# Patient Record
Sex: FEMALE | Race: BLACK OR AFRICAN AMERICAN | Employment: OTHER | URBAN - METROPOLITAN AREA
[De-identification: names, ages, dates, MRNs, and addresses within clinical notes are randomized per-mention and may not be internally consistent; named-entity substitution may affect disease eponyms.]

---

## 2018-06-26 ENCOUNTER — HOSPITAL ENCOUNTER (OUTPATIENT)
Age: 58
Setting detail: OUTPATIENT SURGERY
Discharge: HOME | End: 2018-06-26
Attending: INTERNAL MEDICINE | Admitting: INTERNAL MEDICINE
Payer: COMMERCIAL

## 2018-06-26 VITALS
DIASTOLIC BLOOD PRESSURE: 68 MMHG | TEMPERATURE: 96.9 F | OXYGEN SATURATION: 100 % | RESPIRATION RATE: 20 BRPM | HEIGHT: 63 IN | SYSTOLIC BLOOD PRESSURE: 119 MMHG | WEIGHT: 189 LBS | HEART RATE: 76 BPM | BODY MASS INDEX: 33.49 KG/M2

## 2018-06-26 LAB — GLUCOSE BLD STRIP.AUTO-MCNC: 111 MG/DL (ref 70–110)

## 2018-06-26 PROCEDURE — 99153 MOD SED SAME PHYS/QHP EA: CPT | Performed by: INTERNAL MEDICINE

## 2018-06-26 PROCEDURE — G0500 MOD SEDAT ENDO SERVICE >5YRS: HCPCS | Performed by: INTERNAL MEDICINE

## 2018-06-26 PROCEDURE — 74011250636 HC RX REV CODE- 250/636: Performed by: INTERNAL MEDICINE

## 2018-06-26 PROCEDURE — 76040000007: Performed by: INTERNAL MEDICINE

## 2018-06-26 PROCEDURE — 82962 GLUCOSE BLOOD TEST: CPT

## 2018-06-26 PROCEDURE — 77030013991 HC SNR POLYP ENDOSC BSC -A: Performed by: INTERNAL MEDICINE

## 2018-06-26 PROCEDURE — 74011250636 HC RX REV CODE- 250/636

## 2018-06-26 PROCEDURE — 77030038020 HC MANFLD NEPTUNE STRY -B: Performed by: INTERNAL MEDICINE

## 2018-06-26 PROCEDURE — 77030020256 HC SOL INJ NACL 0.9%  500ML: Performed by: INTERNAL MEDICINE

## 2018-06-26 PROCEDURE — 88305 TISSUE EXAM BY PATHOLOGIST: CPT | Performed by: INTERNAL MEDICINE

## 2018-06-26 RX ORDER — NALOXONE HYDROCHLORIDE 0.4 MG/ML
0.4 INJECTION, SOLUTION INTRAMUSCULAR; INTRAVENOUS; SUBCUTANEOUS
Status: DISCONTINUED | OUTPATIENT
Start: 2018-06-26 | End: 2018-06-26 | Stop reason: HOSPADM

## 2018-06-26 RX ORDER — DEXTROMETHORPHAN/PSEUDOEPHED 2.5-7.5/.8
1.2 DROPS ORAL
Status: CANCELLED | OUTPATIENT
Start: 2018-06-26

## 2018-06-26 RX ORDER — SODIUM CHLORIDE 9 MG/ML
125 INJECTION, SOLUTION INTRAVENOUS CONTINUOUS
Status: DISCONTINUED | OUTPATIENT
Start: 2018-06-26 | End: 2018-06-26 | Stop reason: HOSPADM

## 2018-06-26 RX ORDER — FLUMAZENIL 0.1 MG/ML
0.2 INJECTION INTRAVENOUS
Status: DISCONTINUED | OUTPATIENT
Start: 2018-06-26 | End: 2018-06-26 | Stop reason: HOSPADM

## 2018-06-26 RX ORDER — FENTANYL CITRATE 50 UG/ML
100 INJECTION, SOLUTION INTRAMUSCULAR; INTRAVENOUS
Status: DISCONTINUED | OUTPATIENT
Start: 2018-06-26 | End: 2018-06-26 | Stop reason: HOSPADM

## 2018-06-26 RX ORDER — GABAPENTIN 300 MG/1
500 CAPSULE ORAL 2 TIMES DAILY
COMMUNITY

## 2018-06-26 RX ORDER — EZETIMIBE 10 MG/1
10 TABLET ORAL
COMMUNITY

## 2018-06-26 RX ORDER — ATROPINE SULFATE 0.1 MG/ML
0.5 INJECTION INTRAVENOUS
Status: CANCELLED | OUTPATIENT
Start: 2018-06-26 | End: 2018-06-27

## 2018-06-26 RX ORDER — MIDAZOLAM HYDROCHLORIDE 1 MG/ML
.5-5 INJECTION, SOLUTION INTRAMUSCULAR; INTRAVENOUS
Status: DISCONTINUED | OUTPATIENT
Start: 2018-06-26 | End: 2018-06-26 | Stop reason: HOSPADM

## 2018-06-26 RX ORDER — ATORVASTATIN CALCIUM 20 MG/1
20 TABLET, FILM COATED ORAL DAILY
COMMUNITY

## 2018-06-26 RX ORDER — METFORMIN HYDROCHLORIDE 500 MG/1
500 TABLET ORAL 2 TIMES DAILY WITH MEALS
COMMUNITY

## 2018-06-26 RX ORDER — SODIUM CHLORIDE 9 MG/ML
100 INJECTION, SOLUTION INTRAVENOUS CONTINUOUS
Status: CANCELLED | OUTPATIENT
Start: 2018-06-26 | End: 2018-06-26

## 2018-06-26 RX ORDER — EPINEPHRINE 0.1 MG/ML
1 INJECTION INTRACARDIAC; INTRAVENOUS
Status: CANCELLED | OUTPATIENT
Start: 2018-06-26 | End: 2018-06-27

## 2018-06-26 RX ADMIN — SODIUM CHLORIDE 125 ML/HR: 900 INJECTION, SOLUTION INTRAVENOUS at 08:59

## 2018-06-26 NOTE — DISCHARGE INSTRUCTIONS
Melanie Lyons  [id number]  1960    COLON DISCHARGE INSTRUCTIONS    Discomfort:  Redness at IV site- apply warm compress to area; if redness or soreness persist- contact your physician  There may be a slight amount of blood passed from the rectum  Gaseous discomfort- walking, belching will help relieve any discomfort  You may not operate a vehicle til the next day. You may not engage in an occupation involving machinery or appliances til the next day. You may not drink alcoholic beverages til the next day. DIET:   High fiber diet. ACTIVITY:  You may not  resume your normal daily activities til the next day. it is recommended that you spend the remainder of the day resting -  avoid any strenuous activity. CALL M.D.  IF ANY SIGN OF:   Increasing pain, nausea, vomiting  Abdominal distension (swelling)  New increased bleeding (oral or rectal)  Fever (chills)  Pain in chest area  Bloody discharge from nose or mouth  Shortness of breath    You may not  take any Advil, Aspirin, Ibuprofen, Motrin, Aleve, or Goodys for 5 days, ONLY  Tylenol as needed for pain. Post procedure diagnosis:  Small internal hemorrhoids. Sigmoid polyps    Follow-up Instructions: Your follow up colonoscopy will be in 10 years pending the result of the histology. We will notify you the results of your biopsy by letter within 2 weeks. Holly Chan MD  June 26, 2018     Patient armband removed and shredded    DISCHARGE SUMMARY from Nurse    PATIENT INSTRUCTIONS:    After general anesthesia or intravenous sedation, for 24 hours or while taking prescription Narcotics:  · Limit your activities  · Do not drive and operate hazardous machinery  · Do not make important personal or business decisions  · Do  not drink alcoholic beverages  · If you have not urinated within 8 hours after discharge, please contact your surgeon on call.     Report the following to your surgeon:  · Excessive pain, swelling, redness or odor of or around the surgical area  · Temperature over 100.5  · Nausea and vomiting lasting longer than 4 hours or if unable to take medications  · Any signs of decreased circulation or nerve impairment to extremity: change in color, persistent  numbness, tingling, coldness or increase pain  · Any questions    What to do at Home:  Recommended activity: Activity as tolerated and no driving for today,     If you experience any of the following symptoms as above, please follow up with dr. Leander Siu. *  Please give a list of your current medications to your Primary Care Provider. *  Please update this list whenever your medications are discontinued, doses are      changed, or new medications (including over-the-counter products) are added. *  Please carry medication information at all times in case of emergency situations. These are general instructions for a healthy lifestyle:    No smoking/ No tobacco products/ Avoid exposure to second hand smoke  Surgeon General's Warning:  Quitting smoking now greatly reduces serious risk to your health. Obesity, smoking, and sedentary lifestyle greatly increases your risk for illness    A healthy diet, regular physical exercise & weight monitoring are important for maintaining a healthy lifestyle    You may be retaining fluid if you have a history of heart failure or if you experience any of the following symptoms:  Weight gain of 3 pounds or more overnight or 5 pounds in a week, increased swelling in our hands or feet or shortness of breath while lying flat in bed. Please call your doctor as soon as you notice any of these symptoms; do not wait until your next office visit. Recognize signs and symptoms of STROKE:    F-face looks uneven    A-arms unable to move or move unevenly    S-speech slurred or non-existent    T-time-call 911 as soon as signs and symptoms begin-DO NOT go       Back to bed or wait to see if you get better-TIME IS BRAIN.     Warning Signs of HEART ATTACK     Call 911 if you have these symptoms:   Chest discomfort. Most heart attacks involve discomfort in the center of the chest that lasts more than a few minutes, or that goes away and comes back. It can feel like uncomfortable pressure, squeezing, fullness, or pain.  Discomfort in other areas of the upper body. Symptoms can include pain or discomfort in one or both arms, the back, neck, jaw, or stomach.  Shortness of breath with or without chest discomfort.  Other signs may include breaking out in a cold sweat, nausea, or lightheadedness. Don't wait more than five minutes to call 911 - MINUTES MATTER! Fast action can save your life. Calling 911 is almost always the fastest way to get lifesaving treatment. Emergency Medical Services staff can begin treatment when they arrive -- up to an hour sooner than if someone gets to the hospital by car. The discharge information has been reviewed with the patient and caregiver. The patient and caregiver verbalized understanding. Discharge medications reviewed with the patient and caregiver and appropriate educational materials and side effects teaching were provided.   ___________________________________________________________________________________________________________________________________

## 2018-06-26 NOTE — IP AVS SNAPSHOT
[address] [place] [id number] 
[phone] Patient: Tiesha Marti MRN: [id number] CGK:[id number] About your hospitalization You were admitted on:  June 26, 2018 You last received care in the:  [facility] ENDOSCOPY You were discharged on:  June 26, 2018 Why you were hospitalized Your primary diagnosis was:  Not on File Follow-up Information Follow up With Details Comments Contact Info Sj Cool MD   [address] [place] [address] [address] [place] [address] 
[phone] Carla Winkler MD   Patient can only remember the practice name and not the physician Discharge Orders None A check aki indicates which time of day the medication should be taken. My Medications CONTINUE taking these medications Instructions Each Dose to Equal  
 Morning Noon Evening Bedtime  
 gabapentin 300 mg capsule Commonly known as:  NEURONTIN Your last dose was: Your next dose is: Take 500 mg by mouth two (2) times a day. 500 mg  
    
   
   
   
  
 LIPITOR 20 mg tablet Generic drug:  atorvastatin Your last dose was: Your next dose is: Take 20 mg by mouth daily. 20 mg  
    
   
   
   
  
 Liraglutide 0.6 mg/0.1 mL (18 mg/3 mL) Pnij Commonly known as:  Abhishek Maynard Your last dose was: Your next dose is: 1.2 mg by SubCUTAneous route daily. 1.2 mg  
    
   
   
   
  
 metFORMIN 500 mg tablet Commonly known as:  GLUCOPHAGE Your last dose was: Your next dose is: Take 500 mg by mouth two (2) times daily (with meals). 500 mg ZETIA 10 mg tablet Generic drug:  ezetimibe Your last dose was: Your next dose is: Take 10 mg by mouth. 10 mg Discharge Instructions Tiesha Marti 
[id number] 1960 COLON DISCHARGE INSTRUCTIONS Discomfort: 
Redness at IV site- apply warm compress to area; if redness or soreness persist- contact your physician There may be a slight amount of blood passed from the rectum Gaseous discomfort- walking, belching will help relieve any discomfort You may not operate a vehicle til the next day. You may not engage in an occupation involving machinery or appliances til the next day. You may not drink alcoholic beverages til the next day. DIET: 
 High fiber diet. ACTIVITY: 
You may not  resume your normal daily activities til the next day. it is recommended that you spend the remainder of the day resting -  avoid any strenuous activity. CALL BRANDON Mejia ANY SIGN OF: Increasing pain, nausea, vomiting Abdominal distension (swelling) New increased bleeding (oral or rectal) Fever (chills) Pain in chest area Bloody discharge from nose or mouth Shortness of breath You may not  take any Advil, Aspirin, Ibuprofen, Motrin, Aleve, or Goodys for 5 days, ONLY  Tylenol as needed for pain. Post procedure diagnosis:  Small internal hemorrhoids. Sigmoid polyps Follow-up Instructions: Your follow up colonoscopy will be in 10 years pending the result of the histology. We will notify you the results of your biopsy by letter within 2 weeks. Sarah Partida MD 
June 26, 2018 Patient armband removed and shredded DISCHARGE SUMMARY from Nurse PATIENT INSTRUCTIONS: 
 
 
F-face looks uneven A-arms unable to move or move unevenly S-speech slurred or non-existent T-time-call 911 as soon as signs and symptoms begin-DO NOT go Back to bed or wait to see if you get better-TIME IS BRAIN.  
 
Warning Signs of HEART ATTACK  
 
 Call 911 if you have these symptoms: 
? Chest discomfort. Most heart attacks involve discomfort in the center of the chest that lasts more than a few minutes, or that goes away and comes back. It can feel like uncomfortable pressure, squeezing, fullness, or pain. ? Discomfort in other areas of the upper body. Symptoms can include pain or discomfort in one or both arms, the back, neck, jaw, or stomach. ? Shortness of breath with or without chest discomfort. ? Other signs may include breaking out in a cold sweat, nausea, or lightheadedness. Don't wait more than five minutes to call [address]! Fast action can save your life. Calling 911 is almost always the fastest way to get lifesaving treatment. Emergency Medical Services staff can begin treatment when they arrive  up to an hour sooner than if someone gets to the hospital by car. The discharge information has been reviewed with the patient and caregiver. The patient and caregiver verbalized understanding. Discharge medications reviewed with the patient and caregiver and appropriate educational materials and side effects teaching were provided. ___________________________________________________________________________________________________________________________________ Introducing [facility]! [facility] introduces Tutorspree patient portal. Now you can access parts of your medical record, email your doctor's office, and request medication refills online. 1. In your internet browser, go to [email or web address] 2. Click on the First Time User? Click Here link in the Sign In box. You will see the New Member Sign Up page. 3. Enter your Tutorspree Access Code exactly as it appears below. You will not need to use this code after youve completed the sign-up process. If you do not sign up before the expiration date, you must request a new code.  
 
· Tutorspree Access Code: [email or web address] 
 Expires: 9/24/2018 11:03 AM 
 
4. Enter the last four digits of your Social Security Number (xxxx) and Date of Birth (mm/dd/yyyy) as indicated and click Submit. You will be taken to the next sign-up page. 5. Create a i[employer] ID. This will be your DanceOn login ID and cannot be changed, so think of one that is secure and easy to remember. 6. Create a DanceOn password. You can change your password at any time. 7. Enter your Password Reset Question and Answer. This can be used at a later time if you forget your password. 8. Enter your e-mail address. You will receive e-mail notification when new information is available in [address] 9. Click Sign Up. You can now view and download portions of your medical record. 10. Click the Download Summary menu link to download a portable copy of your medical information. If you have questions, please visit the Frequently Asked Questions section of the DanceOn website. Remember, DanceOn is NOT to be used for urgent needs. For medical emergencies, dial 911. Now available from your iPhone and Android! Introducing Eleazar Nieves As a [employer] patient, I wanted to make you aware of our electronic visit tool called Eleazar Nieves. [employer] 24/7 allows you to connect within minutes with a medical provider 24 hours a day, seven days a week via a mobile device or tablet or logging into a secure website from your computer. You can access Eleazar Pabloromeofin from anywhere in the [place]. A virtual visit might be right for you when you have a simple condition and feel like you just dont want to get out of bed, or cant get away from work for an appointment, when your regular [employer] provider is not available (evenings, weekends or holidays), or when youre out of town and need minor care.   Electronic visits cost only $49 and if the Eleazar Ezequiel provider determines a prescription is needed to treat your condition, one can be electronically transmitted to a nearby pharmacy*. Please take a moment to enroll today if you have not already done so. The enrollment process is free and takes just a few minutes. To enroll, please download the [employer] 24/7 brenda to your tablet or phone, or visit [email or web address] to enroll on your computer. And, as an [address] [place] [address] patient with a [employer] account, the results of your visits will be scanned into your electronic medical record and your primary care provider will be able to view the scanned results. We urge you to continue to see your regular [employer] provider for your ongoing medical care. And while your primary care provider may not be the one available when you seek a [employer] virtual visit, the peace of mind you get from getting a real diagnosis real time can be priceless. For more information on [employer], view our Frequently Asked Questions (FAQs) at [email or web address]. Sincerely, 
 
Vic Souza MD 
Chief Medical Officer [employer] *:  certain medications cannot be prescribed via [employer] Providers Seen During Your Hospitalization Provider Specialty Primary office phone Kaylie Mcgowan MD Gastroenterology [phone] Your Primary Care Physician (PCP) Primary Care Physician Office Phone Office Fax OTHER, PHYS ** None ** ** None ** You are allergic to the following Allergen Reactions Norco (Hydrocodone-Acetaminophen) Itching Recent Documentation Height Weight Breastfeeding? BMI OB Status Smoking Status 1.6 m 85.7 kg No 33.48 kg/m2 Hysterectomy Former Smoker Emergency Contacts Name Discharge Info Relation Home Work Mobile Yannick Dixon DISCHARGE CAREGIVER [3] Other Relative [6]   [phone] Patient Belongings The following personal items are in your possession at time of discharge: Dental Appliances: None  Visual Aid: Glasses, With patient Please provide this summary of care documentation to your next provider. Signatures-by signing, you are acknowledging that this After Visit Summary has been reviewed with you and you have received a copy. Patient Signature:  ____________________________________________________________ Date:  ____________________________________________________________  
  
Deep Endo Provider Signature:  ____________________________________________________________ Date:  ____________________________________________________________

## 2018-06-26 NOTE — H&P
This 60year old female presents for Colon Cancer Screening. History of Present Illness:  1. Colon Cancer Screening      Prior screening:  colonoscopy and 1/2011 Dr. Kelechi Singh. Risk Factors: history of other malignancy and M-Brain. Pertinent negatives include abdominal pain, change in bowel habits, constipation, decreased appetite, diarrhea, melena, nausea, vomiting and weight loss. Additional information: No family history of colon cancer, No family history of Crohn's/colitis and No NSAID/ASA use.             Chronic conditions addressed today:  Diagnosis Description Code Status HPI Comments   Personal history of colonic polyps Z86.010         PROBLEM LIST:  Problem Description Onset Date   Joint pain in ankle and foot 04/02/2013   Hyperlipidemia due to type 2 diabetes mellitus 03/10/2017   H/O lower GIT neoplasm 06/08/2018   Benign essential hypertension 11/17/2008   Personal history of colonic polyps 05/23/2018   H/O: hysterectomy    Nonproliferative diabetic retinopathy 03/27/2013     PROBLEM LIST (not yet mapped to SNOMED-CT®):  Problem Description Onset Date Notes   Diabetes with ophthalmic manifestations, type II o 03/27/2013          PAST MEDICAL/SURGICAL HISTORY   (Detailed)    Disease/disorder Onset Date Management Date Comments   breast mass 2013 Biopsy     Diabetes 1994 Diabetic eye exam 10/23/2017    Diabetes 1994 Foot Exam 12/06/2013    Diabetes type 2 1994      Diabetes type 2  Diabetic Foot Exam 05/23/2018      hysterectomy-BSO 1987      Appendectomy       Gall Bladder     Hyperlipidemia       Hypertension         DIAGNOSTICS HISTORY:  Test Ordered Interpretation Result completed   Mammogram, screening, both breasts    05/01/2007   Cardiac Stress Test    05/01/2006   LUNG DIFFERENTIAL FUNCTION 12/10/2009 Abnormal mild restriction 12/10/2009   ELECTROCARDIOGRAM, COMPLETE 12/10/2009 Normal  12/10/2009   CHEST X-RAY PA & LAT 12/10/2009   12/10/2009   * Screening mammography digital 03/21/2012 normal  04/12/2012   * X-RAY EXAM OF SHOULDER BLADE RT 12/12/2012 12/12/2012   * X-RAY EXAM OF SHOULDER Min 2 Views RT 12/12/2012 12/12/2012   MAMMOGRAM, SCREENING 02/27/2013 abnormal Right breast assymetry. see scanned/cc 04/16/2013   * X-RAY EXAM OF ANKLE, Complete RT ankle 04/09/2013 See details  Location is the ankle. the patient has a large dorsal exostosis along the navicular cuneiform joint of the right foot. There is also some spurring on the plantar aspect of the heel. Ankle shows no degenerative changes or acute fractures 04/09/2013   *Diagnostic mammography digital 12/03/2013 normal  12/04/2013   * Foot 3 Views Right foot 12/11/2013  x-rays reveal end-stage arthritis along the Lisfranc joint complex. The patient also has dorsal exostosis along the first metatarsocuneiform and navicular cuneiform joint. The remainder of the joint in the rear foot showed no signs of arthritis 12/11/2013   * Foot 3 Views Right foot 05/14/2014 05/28/2014   * Foot 3 Views Right foot 05/28/2014  Intact internal fixation is appreciated. There appears to be a fusion across the second metatarsal cuneiform joint. 05/28/2014   *Diagnostic mammography digital Bilateral 12/15/2014 normal  12/30/2014     Test Ordered Ordering Comments Modifier   Mammogram, screening, both breasts      Cardiac Stress Test      LUNG DIFFERENTIAL FUNCTION 12/10/2009     ELECTROCARDIOGRAM, COMPLETE 12/10/2009     CHEST X-RAY PA & LAT 12/10/2009     * Screening mammography digital 03/21/2012     * X-RAY EXAM OF SHOULDER BLADE RT 12/12/2012 Not currently pregnant. RT   * X-RAY EXAM OF SHOULDER Min 2 Views RT 12/12/2012 Not currently pregnant. RT   MAMMOGRAM, SCREENING 02/27/2013     * X-RAY EXAM OF ANKLE, Complete RT ankle 04/09/2013 Not currently pregnant.     *Diagnostic mammography digital 12/03/2013     * Foot 3 Views Right foot 12/11/2013  RT   * Foot 3 Views Right foot 05/14/2014  RT   * Foot 3 Views Right foot 05/28/2014  RT   *Diagnostic mammography digital Bilateral 12/15/2014       Family History:  (Detailed)  Relationship Family Member Name  Age at Death Condition Onset Age Cause of Death   Father    Myocardial infarction  N   Father    Hypertension  N   Father  N  Heart disease 58 N   Mother  Y 80 Stroke  N   Mother    Cancer, lung  N   Mother    Hyperlipidemia  N   Mother    Cancer, brain  N   Mother    Hypertension  N         Social History:  (Detailed)  Tobacco use reviewed. The patient is right-handed. Preferred language is [place]. The patient does not need an . 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 Previously   CHILDREN  Does not have children. Tobacco use status: Ex-cigarette smoker. Smoking status: Former smoker. SMOKING STATUS  Use Status Type Smoking Status Usage Per Day Years Used Total Pack Years   yes Cigarette Former smoker 4 Cigarettes 38.00 7.60     TOBACCO CESSATION INFORMATION  Date Counseled By Order Status Description Code Tobacco Cessation Information   2010 Rhina Alba Tobacco cessation counseling completed   Tobacco cessation counseling       No passive smoke exposure. ALCOHOL  There is no history of alcohol use. CAFFEINE  The patient uses caffeine: coffee and soda. .            Patient Status   Completed with information received for patient transitioning into care. Medication Reconciliation  Medications reconciled today.   Medication Reviewed  Adherence Medication Name Sig Desc Elsewhere Status   taking as directed Novofine 32 32 gauge x 1/4\" needle for once daily use N Verified   taking as directed Contour Next Strips place 1 by finger route  every use to check blood sugar once daily N Verified   taking as directed VICTOZA 18 MG/3ML   INJ MARI INJECT 0.2 ML (1.2 MG) SUBCUTANEOUSLY ONCE DAILY N Verified   taking as directed glucosamine-chondroitin 500 mg-400 mg capsule  Y Verified   taking as directed Lipitor 40 mg tablet TAKE ONE TABLET BY MOUTH DAILY N Verified   taking as directed metformin 850 mg tablet take 1 tablet by oral route 2 times every day with morning and evening meals N Verified   taking as directed Zestoretic 10 mg-12.5 mg tablet take 1 tablet by ORAL route  every day N Verified   taking as directed Zetia 10 mg tablet TAKE ONE TABLET BY MOUTH DAILY N Verified   taking as directed Jublia 10 % topical solution with applicator apply by topical route  every day to affected toenail(s) N Verified   taking as directed GaviLyte-N 420 gram oral solution take as prescribed by physician N Verified   taking as directed gabapentin 300 mg capsule take 1 capsule by ORAL route 2 times every day N Verified     Medications (added, continued or stopped this visit):  Started Medication Directions PRN Status PRN Reason Instruction Stopped   10/05/2016 Contour Next Strips place 1 by finger route  every use to check blood sugar once daily N      05/23/2018 gabapentin 300 mg capsule take 1 capsule by ORAL route 2 times every day N      06/05/2018 GaviLyte-N 420 gram oral solution take as prescribed by physician N       glucosamine-chondroitin 500 mg-400 mg capsule  N   06/15/2018   05/23/2018 Jublia 10 % topical solution with applicator apply by topical route  every day to affected toenail(s) N      03/15/2018 Lipitor 40 mg tablet TAKE ONE TABLET BY MOUTH DAILY N      03/15/2018 metformin 850 mg tablet take 1 tablet by oral route 2 times every day with morning and evening meals N      10/01/2015 Novofine 32 32 gauge x 1/4\" needle for once daily use N      06/15/2018 Tussionex Pennkinetic ER 10 mg-8 mg/5 mL suspension,extended release take 5 milliliter by oral route  every 12 hours N   10/17/2018   02/27/2018 VICTOZA 18 MG/3ML   INJ MARI INJECT 0.2 ML (1.2 MG) SUBCUTANEOUSLY ONCE DAILY N      03/15/2018 Zestoretic 10 mg-12.5 mg tablet take 1 tablet by ORAL route  every day N      03/15/2018 Zetia 10 mg tablet TAKE ONE TABLET BY MOUTH DAILY N      06/15/2018 Zithromax Z-Peter 250 mg tablet take 2 tabs (500mg) today, then 1 tab (250mg) daily for next 4 days N   07/14/2018     Allergies:  Ingredient Reaction Medication Name Comment   NAPROXEN GI problems  NAPROSYN   HYDROCODONE BITARTRATE Itching Norco    (Reviewed, no changes.)    Review of Systems  System Neg/Pos Details   Constitutional Negative Chills, fever, malaise and weight loss. ENMT Negative Nasal congestion and sore throat. Eyes Negative Double vision. Respiratory Negative Asthma, chronic cough and wheezing. Cardio Negative Chest pain, edema and irregular heartbeat/palpitations. GI Negative Abdominal pain, change in bowel habits, constipation, decreased appetite, diarrhea, dysphagia, heartburn, hematemesis, hematochezia, melena, nausea, reflux and vomiting.  Negative Dysuria and hematuria. Endocrine Negative Cold intolerance, heat intolerance and increased thirst.   Neuro Negative Dizziness, headache, numbness, tremors and vertigo. Psych Negative Anxiety, depression and increased stress. Integumentary Negative Hives and rash. MS Positive Joint pain. MS Negative Back pain and myalgia. Hema/Lymph Negative Easy bleeding and easy bruising. Allergic/Immuno Negative Contact allergy, food allergies and seasonal allergies.         Vital Signs     Height  Time ft in cm Last Measured Height Position   1:26 PM 5.0 2.00 157.48 06/05/2018 Standing     Weight/BSA/BMI  Time lb oz kg Context BMI kg/m2 BSA m2   1:26 .00  86.636 dressed without shoes 34.93      Date/Time Temp Pulse BP MAP (Calculated) Arterial Line 1 BP (mmHg) BP Patient Position Resp SpO2 O2 Device O2 Flow Rate (L/min) Pre/Post Ductal Weight      06/26/18 0835 97.4 °F (36.3 °C) 83 135/69 91 -- -- 17 98 % Room air -- -- 85.7 kg (189 lb)         PHYSICAL EXAM:  Exam Findings Details   Constitutional Normal Well developed. Eyes Normal Conjunctiva - Right: Normal, Left: Normal. Sclera - Right: Normal, Left: Normal.   Nasopharynx Normal Lips/teeth/gums - Normal. Buccal mucosa - Normal.   Neck Exam Normal Inspection - Normal. Palpation - Normal. Thyroid gland - Normal.   Respiratory Normal Inspection - Normal. Auscultation - Normal.   Cardiovascular Normal Regular rate and rhythm. No murmurs, gallops, or rubs. Vascular Normal Pulses - Radial: Normal, Brachial: Normal, Dorsalis pedis: Normal, Posterior tibial: Normal.   Abdomen Normal Inspection - Normal. Appliance(s) - Normal. Abdominal muscles - Normal. Auscultation - Normal. Percussion - Normal. Anterior palpation - Normal, No guarding. Umbilicus - Normal. No abdominal tenderness. No hepatic enlargement. No spleen enlargement. No hernia. No Ascites. Ho's sign - Negative. No hepatic tenderness. No hepatic bruit. Skin Normal Inspection - Normal.   Musculoskeletal Normal Hands/Wrist - Right: Normal, Left: Normal.   Extremity Normal No edema. Neurological Normal Fine motor skills - Normal.   Psychiatric Normal Oriented to time, place, person, and situation. Appropriate mood and affect. Assessment/Plan  # Detail Type Description    1. Assessment Personal history of colonic polyps (Z86.010), chronic. Patient Plan 60 yeaqr old female pt of Dr. Refugio Castañeda for colonoscopy screening. Last colonoscopy completed 01/19/2011. Per pathology and impression, two smooth sessile polyps found in hepatic flexure (serrated adenoma), two smooth sessile polyps found in transverse colon (hyperplastic polyp fragments), six smooth sessile and semi pedunculated polyps found in the sigmoid colon(hyperplastic polyp fragments) and rectum. All polyps removed by snare cautery polypectomy. Internal hemorrhoids found. Mother had hx of brain CA. Patient reports no allergies, herbal consumption and immunizations are UTD.  Medical hx includes DM II, hyperlipidemia well controlled with medications. No significant cardiac, pulmonary, thyroid,  or GI issues. Mild arthritis in hands and feet. No sig weight gains/losses in last 3-6 months. No N/V/D, fevers, chills,  sick contacts, SOB, chest or abdominal pains. BM 2-3 X daily. Last BM 06/04/2018. Formed, soft in consistency. No blood or mucus found. No dysphagia and apetite is good consisting of 3 meals per day. Surgical hx includes appendectomy at age 15, total hysterectomy age 26 and cholecystectomy age unk. She has average risk for colon cancer and asymptomatic. She would be having her screening colonoscopy. I explained to her the procedure of colonoscopy and the risks involved which include but not limited to reaction to sedation, bleeding, perforation, infection or missing a lesion if bowels are not well prepped or are unusually tortuous. she agreed to proceed with the procedure and answered her questions. I gave her the Gavlytely to clean her bowels. I advised her to take if needed extra laxatives for few days before incase she is on the constipated side to assure adequate bowel prep. Told her not take her medications in the morning of the procedure because they would be flushed out with the prep but can take them more confidently after the procedure. I advised her to bring all her medication with her. Plan Orders She will be scheduled for GASTROENTEROLOGY PROCEDURE within 1 Month on 06/26/2018.

## 2018-06-26 NOTE — PROCEDURES
[facility]  Colonoscopy Procedure Report  _______________________________________________________  Patient: Narciso Mccray                                         Attending Physician: Guido Vazquez MD    Patient ID: [id number]                                      Referring Physician: Boston Multani MD    Exam Date: June 26, 2018 _______________________________________________________      Introduction: A  60 y.o. female patient, presents for outpatient Colonoscopy    Indications: colonoscopy 01/19/2011. Two smooth sessile serrated adenoma polyps found in hepatic flexure, two smooth sessile hyperplastic polyps found in transverse colon, six smooth sessile and semi pedunculated hyperplastic polyps found in the sigmoid colon and rectum. All polyps removed by snare cautery polypectomy. Internal hemorrhoids found. Mother had hx of brain She is asymptomatic has 1 to 3 bm daily. Consent: The benefits, risks, and alternatives to the procedure were discussed and informed consent was obtained from the patient. Preparation: EKG, pulse, pulse oximetry and blood pressure were monitored throughout the procedure. ASA Classification: Class 2 - . The heart is an S1-S2 and regular heart rate and rhythm. Lungs are clear to auscultation and percussion. Abdomen is soft, nondistended, and nontender. Mental Status: awake, alert, and oriented to person, place, and time    Medications:  · Fentanyl 100 mcg IV before procedure. · Versed 4 mg IV throughout the procedure. Rectal Exam: Normal Rectal Exam. No Blood. Pathology Specimens: One specimen removed. Procedure: The colonoscope was passed with ease through the anus under direct visualization and advanced to the cecum and 10 cm inside the terminal ileum. The scope was withdrawn and the mucosa was carefully examined. The quality of the preparation was very good. The views were excellent. The patient's toleration of the procedure was excellent.  Retroflexion was preformed in the ascending colon and hepatic flexure. The exam was done twice to the cecum and many times in and out in the right colon including the hepatic flexure. Total time is 25 minutes and withdrawal time is 11 minutes. Findings:    Rectum:   Small internal hemorrhoids with one 6 mm hyperplastic polyp in the very distal rectum, hot snared  Sigmoid:   Slightly tortuous sigmoid colon. 4 small sessile smooth polyps 6 to 8 mm in the mid sigmoid all hot snared mostly because of their larger size. Descending Colon:   Normal  Transverse Colon:   Normal  Ascending Colon:   Normal  Cecum:   Normal  Terminal Ileum:   Normal    Unplanned Events: There were no unplanned events. Estimated Blood Loss: None  Impressions:    Slightly tortuous sigmoid colon. 4 small sessile smooth polyps 6 to 8 mm in the mid sigmoid all hot snared. Small internal hemorrhoids with one 6 mm hyperplastic polyp in the very distal rectum, hot snared. Normal Mucosa. No diverticula or true adenoma polyp found. Complications: None; patient tolerated the procedure well. Recommendations:  · Discharge home when standard parameters are met. · Resume a high fiber diet. · Colonoscopy recommendation in reasonable in 10 years, pending the result of the histology.     Procedure Codes:    · Milly Braga [[id number]]    Endoscope Information:  Model Number(s)    [id number]       Assistant: None      Signed By: Tatum Angel MD Date: June 26, 2018

## 2024-05-10 ENCOUNTER — HOSPITAL ENCOUNTER (EMERGENCY)
Facility: HOSPITAL | Age: 64
Discharge: HOME | End: 2024-05-10
Payer: COMMERCIAL

## 2024-05-10 VITALS
HEART RATE: 80 BPM | OXYGEN SATURATION: 100 % | RESPIRATION RATE: 18 BRPM | DIASTOLIC BLOOD PRESSURE: 56 MMHG | SYSTOLIC BLOOD PRESSURE: 104 MMHG | TEMPERATURE: 97.8 F
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
## 2024-05-10 DIAGNOSIS — R51.9 ACUTE NONINTRACTABLE HEADACHE, UNSPECIFIED HEADACHE TYPE: Primary | ICD-10-CM

## 2024-05-10 PROCEDURE — 99282 EMERGENCY DEPT VISIT SF MDM: CPT
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
## (undated) DEVICE — TRNQT TEXT 1X18IN BLU LF DISP -- CONVERT TO ITEM 362165

## (undated) DEVICE — CATH SUC CTRL PRT TRIFLO 14FR --

## (undated) DEVICE — MAJ-1414 SINGLE USE ADPATER BIOPSY VALV: Brand: SINGLE USE ADAPTOR BIOPSY VALVE

## (undated) DEVICE — SNARE POLYP SM W13MMXL240CM SHTH DIA2.4MM OVL FLX DISP

## (undated) DEVICE — SINGLE PORT MANIFOLD: Brand: NEPTUNE 2

## (undated) DEVICE — SPONGE GZ W4XL4IN COT 12 PLY TYP VII WVN C FLD DSGN

## (undated) DEVICE — CANNULA CUSH AD W/ 14FT TBG

## (undated) DEVICE — SOLUTION IV 500ML 0.9% SOD CHL FLX CONT

## (undated) DEVICE — NDL FLTR TIP 5 MIC 18GX1.5IN --

## (undated) DEVICE — MEDI-VAC NON-CONDUCTIVE SUCTION TUBING: Brand: CARDINAL HEALTH

## (undated) DEVICE — SET ADMIN 16ML TBNG L100IN 2 Y INJ SITE IV PIGGY BK DISP

## (undated) DEVICE — SYR 3ML LL TIP 1/10ML GRAD --

## (undated) DEVICE — ENDO CARRY-ON PROCEDURE KIT INCLUDES ENZYMATIC SPONGE, GAUZE, BIOHAZARD LABEL, TRAY, LUBRICANT, DIRTY SCOPE LABEL, WATER LABEL, TRAY, DRAWSTRING PAD, AND DEFENDO 4-PIECE KIT.: Brand: ENDO CARRY-ON PROCEDURE KIT

## (undated) DEVICE — SYRINGE 50ML E/T

## (undated) DEVICE — ERBE NESSY®PLATE 170 SPLIT; 168CM²; CABLE 3M: Brand: ERBE

## (undated) DEVICE — SYR 5ML 1/5 GRAD LL NSAF LF --

## (undated) DEVICE — SPONGE GZ W4XL4IN RAYON POLY 4 PLY NONWOVEN FASTER WICKING

## (undated) DEVICE — TRAP SPEC COLL POLYP POLYSTYR --

## (undated) DEVICE — WRISTBAND ID AD W2.5XL9.5CM RED VYN ADH CLSR UNI-PRINT

## (undated) DEVICE — NDL PRT INJ NSAF BLNT 18GX1.5 --

## (undated) DEVICE — CATH IV SAFE STR 22GX1IN BLU -- PROTECTIV PLUS

## (undated) DEVICE — KENDALL RADIOLUCENT FOAM MONITORING ELECTRODE RECTANGULAR SHAPE: Brand: KENDALL